# Patient Record
Sex: MALE | Race: WHITE | NOT HISPANIC OR LATINO
[De-identification: names, ages, dates, MRNs, and addresses within clinical notes are randomized per-mention and may not be internally consistent; named-entity substitution may affect disease eponyms.]

---

## 2017-02-08 ENCOUNTER — APPOINTMENT (OUTPATIENT)
Dept: CT IMAGING | Facility: CLINIC | Age: 47
End: 2017-02-08

## 2023-09-12 ENCOUNTER — APPOINTMENT (OUTPATIENT)
Dept: ORTHOPEDIC SURGERY | Facility: CLINIC | Age: 53
End: 2023-09-12

## 2024-04-16 ENCOUNTER — APPOINTMENT (OUTPATIENT)
Dept: ORTHOPEDIC SURGERY | Facility: CLINIC | Age: 54
End: 2024-04-16
Payer: COMMERCIAL

## 2024-04-16 DIAGNOSIS — M17.12 UNILATERAL PRIMARY OSTEOARTHRITIS, LEFT KNEE: ICD-10-CM

## 2024-04-16 PROCEDURE — 73564 X-RAY EXAM KNEE 4 OR MORE: CPT | Mod: LT

## 2024-04-16 PROCEDURE — 99214 OFFICE O/P EST MOD 30 MIN: CPT | Mod: 25

## 2024-04-16 PROCEDURE — 20610 DRAIN/INJ JOINT/BURSA W/O US: CPT | Mod: LT

## 2024-04-16 RX ORDER — OXYCODONE HYDROCHLORIDE AND ACETAMINOPHEN 10; 325 MG/1; MG/1
TABLET ORAL
Refills: 0 | Status: ACTIVE | COMMUNITY

## 2024-04-16 RX ORDER — CYCLOBENZAPRINE HYDROCHLORIDE 7.5 MG/1
TABLET, FILM COATED ORAL
Refills: 0 | Status: ACTIVE | COMMUNITY

## 2024-04-16 NOTE — IMAGING
[de-identified] : LEFT KNEE EXAM Alignment: Neutral  Effusion: None Atrophy: None                                                  Stable to Varus/valgus stress Posterior Drawer Test: negative Anterior Drawer Test: Negative Knee Extension/Flexion: 0 / 120  Medial/lateral compartments Medial joint line: No Tenderness Lateral joint line: No Tenderness Aileen test: negative  Patellofemoral joint Medial patellar facet: no tenderness Patellar grind: Negative  Tendons: Pes Anserine: No tenderness Gerdys Tubercle/ IT Band: No tenderness Quadriceps Tendon: No Tenderness patellar tendon: no Tenderness Tibial tubercle: not tenderness Calf: no Tenderness  Neurovascular exam Muscle strength: 5/5 Sensation to light touch: intact Distal pulses: 2+  [id number] Xrays of the Left Knee were taken demonstrating tricompartmental arthritis with joint space collapse, osteophytes, and sclerosis

## 2024-04-16 NOTE — PROCEDURE
[FreeTextEntry3] : The risks, benefits and alternatives to the injection were discussed with the patient. The decision was made to proceed with the injection to reduce inflammation within the area. Verbal consent was obtained for the procedure. The left knee was cleaned with alcohol and ethyl chloride was sprayed topically. 1cc of 40mg Kenalog and 4cc of 1% lidocaine was injected. The patient tolerated the procedure well and was instructed to ice the affected joint as need+-ed later today. Activities can be performed as tolerated

## 2024-04-16 NOTE — HISTORY OF PRESENT ILLNESS
[de-identified] : 04/16/2024: KEVAN JHA is a 53 year old male complaining of chronic left knee pain. h/o CSI's and two rounds of gel injections. pt states gel injections cause knee to swell. no h/o knee surgery. treated by outside orthro, dx'd severe OA. +OTC PRN pain. seeing pain management for spine. taking Percocet for spine, does not help with knee pain.  h/o L-spine surgery 2016 (hardware placed)

## 2024-04-16 NOTE — ASSESSMENT
[FreeTextEntry1] : 04/16/2024: 54 y/o M with severe L knee OA  CSI L knee 55 lb weight loss for BMI goal of 40

## 2024-06-18 ENCOUNTER — APPOINTMENT (OUTPATIENT)
Dept: ORTHOPEDIC SURGERY | Facility: CLINIC | Age: 54
End: 2024-06-18